# Patient Record
Sex: FEMALE | Race: WHITE | URBAN - METROPOLITAN AREA
[De-identification: names, ages, dates, MRNs, and addresses within clinical notes are randomized per-mention and may not be internally consistent; named-entity substitution may affect disease eponyms.]

---

## 2019-08-01 ENCOUNTER — HOSPITAL ENCOUNTER (EMERGENCY)
Facility: CLINIC | Age: 59
Discharge: HOME | End: 2019-08-01
Attending: INTERNAL MEDICINE | Admitting: INTERNAL MEDICINE
Payer: COMMERCIAL

## 2019-08-01 VITALS
TEMPERATURE: 97.4 F | HEART RATE: 87 BPM | OXYGEN SATURATION: 98 % | RESPIRATION RATE: 16 BRPM | WEIGHT: 165 LBS | SYSTOLIC BLOOD PRESSURE: 151 MMHG | HEIGHT: 65 IN | DIASTOLIC BLOOD PRESSURE: 84 MMHG | BODY MASS INDEX: 27.49 KG/M2

## 2019-08-01 DIAGNOSIS — R42 DIZZINESS: ICD-10-CM

## 2019-08-01 DIAGNOSIS — N39.0 ACUTE UTI: ICD-10-CM

## 2019-08-01 LAB
ALBUMIN UR-MCNC: NEGATIVE MG/DL
ANION GAP SERPL CALCULATED.3IONS-SCNC: 9 MMOL/L (ref 3–14)
APPEARANCE UR: CLEAR
BACTERIA #/AREA URNS HPF: ABNORMAL /HPF
BASOPHILS # BLD AUTO: 0.1 10E9/L (ref 0–0.2)
BASOPHILS NFR BLD AUTO: 0.8 %
BILIRUB UR QL STRIP: NEGATIVE
BUN SERPL-MCNC: 13 MG/DL (ref 7–30)
CALCIUM SERPL-MCNC: 8.6 MG/DL (ref 8.5–10.1)
CHLORIDE SERPL-SCNC: 106 MMOL/L (ref 94–109)
CO2 SERPL-SCNC: 26 MMOL/L (ref 20–32)
COLOR UR AUTO: ABNORMAL
CREAT SERPL-MCNC: 0.61 MG/DL (ref 0.52–1.04)
DIFFERENTIAL METHOD BLD: ABNORMAL
EOSINOPHIL # BLD AUTO: 0.4 10E9/L (ref 0–0.7)
EOSINOPHIL NFR BLD AUTO: 4.7 %
ERYTHROCYTE [DISTWIDTH] IN BLOOD BY AUTOMATED COUNT: 13.9 % (ref 10–15)
GFR SERPL CREATININE-BSD FRML MDRD: >90 ML/MIN/{1.73_M2}
GLUCOSE SERPL-MCNC: 109 MG/DL (ref 70–99)
GLUCOSE UR STRIP-MCNC: NEGATIVE MG/DL
HCT VFR BLD AUTO: 35.3 % (ref 35–47)
HGB BLD-MCNC: 11.3 G/DL (ref 11.7–15.7)
HGB UR QL STRIP: NEGATIVE
IMM GRANULOCYTES # BLD: 0 10E9/L (ref 0–0.4)
IMM GRANULOCYTES NFR BLD: 0.2 %
KETONES UR STRIP-MCNC: NEGATIVE MG/DL
LEUKOCYTE ESTERASE UR QL STRIP: ABNORMAL
LYMPHOCYTES # BLD AUTO: 2.5 10E9/L (ref 0.8–5.3)
LYMPHOCYTES NFR BLD AUTO: 28.9 %
MCH RBC QN AUTO: 27.8 PG (ref 26.5–33)
MCHC RBC AUTO-ENTMCNC: 32 G/DL (ref 31.5–36.5)
MCV RBC AUTO: 87 FL (ref 78–100)
MONOCYTES # BLD AUTO: 0.4 10E9/L (ref 0–1.3)
MONOCYTES NFR BLD AUTO: 4.7 %
MUCOUS THREADS #/AREA URNS LPF: PRESENT /LPF
NEUTROPHILS # BLD AUTO: 5.2 10E9/L (ref 1.6–8.3)
NEUTROPHILS NFR BLD AUTO: 60.7 %
NITRATE UR QL: NEGATIVE
NRBC # BLD AUTO: 0 10*3/UL
NRBC BLD AUTO-RTO: 0 /100
PH UR STRIP: 6.5 PH (ref 5–7)
PLATELET # BLD AUTO: 267 10E9/L (ref 150–450)
POTASSIUM SERPL-SCNC: 3.4 MMOL/L (ref 3.4–5.3)
RBC # BLD AUTO: 4.07 10E12/L (ref 3.8–5.2)
RBC #/AREA URNS AUTO: 2 /HPF (ref 0–2)
SODIUM SERPL-SCNC: 140 MMOL/L (ref 133–144)
SOURCE: ABNORMAL
SP GR UR STRIP: 1.01 (ref 1–1.03)
SQUAMOUS #/AREA URNS AUTO: 9 /HPF (ref 0–1)
TRANS CELLS #/AREA URNS HPF: <1 /HPF (ref 0–1)
TROPONIN I SERPL-MCNC: <0.015 UG/L (ref 0–0.04)
UROBILINOGEN UR STRIP-MCNC: NORMAL MG/DL (ref 0–2)
WBC # BLD AUTO: 8.6 10E9/L (ref 4–11)
WBC #/AREA URNS AUTO: 4 /HPF (ref 0–5)

## 2019-08-01 PROCEDURE — 96374 THER/PROPH/DIAG INJ IV PUSH: CPT

## 2019-08-01 PROCEDURE — 87086 URINE CULTURE/COLONY COUNT: CPT | Performed by: EMERGENCY MEDICINE

## 2019-08-01 PROCEDURE — 85025 COMPLETE CBC W/AUTO DIFF WBC: CPT | Performed by: EMERGENCY MEDICINE

## 2019-08-01 PROCEDURE — 96361 HYDRATE IV INFUSION ADD-ON: CPT

## 2019-08-01 PROCEDURE — 93005 ELECTROCARDIOGRAM TRACING: CPT

## 2019-08-01 PROCEDURE — 99284 EMERGENCY DEPT VISIT MOD MDM: CPT | Mod: Z6 | Performed by: INTERNAL MEDICINE

## 2019-08-01 PROCEDURE — 25000128 H RX IP 250 OP 636: Performed by: EMERGENCY MEDICINE

## 2019-08-01 PROCEDURE — 99284 EMERGENCY DEPT VISIT MOD MDM: CPT | Mod: 25

## 2019-08-01 PROCEDURE — 81001 URINALYSIS AUTO W/SCOPE: CPT | Performed by: EMERGENCY MEDICINE

## 2019-08-01 PROCEDURE — 80048 BASIC METABOLIC PNL TOTAL CA: CPT | Performed by: EMERGENCY MEDICINE

## 2019-08-01 PROCEDURE — 84484 ASSAY OF TROPONIN QUANT: CPT | Performed by: EMERGENCY MEDICINE

## 2019-08-01 RX ORDER — ESTRADIOL 1 MG/1
1 TABLET ORAL DAILY
COMMUNITY

## 2019-08-01 RX ORDER — CIPROFLOXACIN 500 MG/1
500 TABLET, FILM COATED ORAL 2 TIMES DAILY
Qty: 14 TABLET | Refills: 0 | Status: SHIPPED | OUTPATIENT
Start: 2019-08-01 | End: 2019-08-08

## 2019-08-01 RX ORDER — LISINOPRIL 10 MG/1
10 TABLET ORAL DAILY
COMMUNITY

## 2019-08-01 RX ORDER — ONDANSETRON 2 MG/ML
4 INJECTION INTRAMUSCULAR; INTRAVENOUS EVERY 30 MIN PRN
Status: DISCONTINUED | OUTPATIENT
Start: 2019-08-01 | End: 2019-08-02 | Stop reason: HOSPADM

## 2019-08-01 RX ORDER — SODIUM CHLORIDE 9 MG/ML
1000 INJECTION, SOLUTION INTRAVENOUS CONTINUOUS
Status: DISCONTINUED | OUTPATIENT
Start: 2019-08-01 | End: 2019-08-02 | Stop reason: HOSPADM

## 2019-08-01 RX ORDER — ATORVASTATIN CALCIUM 40 MG/1
40 TABLET, FILM COATED ORAL DAILY
COMMUNITY

## 2019-08-01 RX ADMIN — SODIUM CHLORIDE 1000 ML: 9 INJECTION, SOLUTION INTRAVENOUS at 19:45

## 2019-08-01 RX ADMIN — ONDANSETRON 4 MG: 2 INJECTION INTRAMUSCULAR; INTRAVENOUS at 19:45

## 2019-08-01 SDOH — HEALTH STABILITY: MENTAL HEALTH: HOW OFTEN DO YOU HAVE A DRINK CONTAINING ALCOHOL?: NEVER

## 2019-08-01 ASSESSMENT — ENCOUNTER SYMPTOMS
FLANK PAIN: 0
DYSURIA: 0
NAUSEA: 1
BACK PAIN: 0
DIARRHEA: 0
HEMATURIA: 0
SORE THROAT: 0
DIAPHORESIS: 1
SHORTNESS OF BREATH: 0
FREQUENCY: 0
FEVER: 0
DIZZINESS: 1
FATIGUE: 1

## 2019-08-01 ASSESSMENT — MIFFLIN-ST. JEOR: SCORE: 1334.32

## 2019-08-01 NOTE — ED AVS SNAPSHOT
[facility], [place], Emergency Department  [address] [address]  Phone:  [phone]                                    Milena Augustin   MRN: [id number]    Department:  [facility], Emergency Department   Date of Visit:  8/1/2019           After Visit Summary Signature Page    I have received my discharge instructions, and my questions have been answered. I have discussed any challenges I see with this plan with the nurse or doctor.    ..........................................................................................................................................  Patient/Patient Representative Signature      ..........................................................................................................................................  Patient Representative Print Name and Relationship to Patient    ..................................................               ................................................  Date                                   Time    ..........................................................................................................................................  Reviewed by Signature/Title    ...................................................              ..............................................  Date                                               Time          22EPIC Rev 08/18

## 2019-08-02 LAB
BACTERIA SPEC CULT: NO GROWTH
INTERPRETATION ECG - MUSE: NORMAL
Lab: NORMAL
SPECIMEN SOURCE: NORMAL

## 2019-08-02 NOTE — ED TRIAGE NOTES
Patient comes in through triage with complaints of dizziness, nausea, and sudden onset diaphoresis, starting approx. 1800 today. Pt. Reports this has not happened to her before. Pt. Denies palpations or chest pain. Hx of HTN

## 2019-08-02 NOTE — ED PROVIDER NOTES
"  History     Chief Complaint   Patient presents with     Dizziness     HPI  Milena Augustin is a 59 year old female who presents for evaluation of dizziness and nausea. Patient reports sudden onset of dizziness, nausea, and sweats today around 6 PM. She states she feels better now, though is somewhat fatigued. She denies back pain, fevers, sore throat, diarrhea, shortness of breath, or change in urination.     History reviewed. No pertinent past medical history.    Past Surgical History:   Procedure Laterality Date     CHOLECYSTECTOMY       GYN SURGERY         History reviewed. No pertinent family history.    Social History     Tobacco Use     Smoking status: Never Smoker     Smokeless tobacco: Never Used   Substance Use Topics     Alcohol use: Never     Frequency: Never       Current Facility-Administered Medications   Medication     ondansetron (ZOFRAN) injection 4 mg     sodium chloride 0.9% infusion     Current Outpatient Medications   Medication     atorvastatin (LIPITOR) 40 MG tablet     ciprofloxacin (CIPRO) 500 MG tablet     estradiol (ESTRACE) 1 MG tablet     lisinopril (PRINIVIL/ZESTRIL) 10 MG tablet      No Known Allergies     I have reviewed the Medications, Allergies, Past Medical and Surgical History, and Social History in the Epic system.    Review of Systems   Constitutional: Positive for diaphoresis and fatigue. Negative for fever.   HENT: Negative for sore throat.    Respiratory: Negative for shortness of breath.    Gastrointestinal: Positive for nausea. Negative for diarrhea.   Genitourinary: Negative for dysuria, flank pain, frequency, hematuria and urgency.   Musculoskeletal: Negative for back pain.   Neurological: Positive for dizziness.   All other systems reviewed and are negative.      Physical Exam   BP: (!) 162/93  Pulse: 82  Temp: 97.4  F (36.3  C)  Resp: 16  Height: 165.1 cm (5' 5\")  Weight: 74.8 kg (165 lb)  SpO2: 94 %      Physical Exam   Constitutional: She is oriented to person, " place, and time. No distress.   HENT:   Head: Atraumatic.   Mouth/Throat: Oropharynx is clear and moist. No oropharyngeal exudate.   Eyes: Pupils are equal, round, and reactive to light. No scleral icterus.   Neck: Neck supple. No JVD present.   Cardiovascular: Normal rate, regular rhythm, normal heart sounds and intact distal pulses. Exam reveals no gallop and no friction rub.   No murmur heard.  Pulmonary/Chest: Effort normal and breath sounds normal. No stridor. No respiratory distress. She has no wheezes. She has no rales. She exhibits no tenderness.   Abdominal: Soft. Bowel sounds are normal. She exhibits no distension and no mass. There is no tenderness. There is no rebound and no guarding. No hernia.   Musculoskeletal: She exhibits no edema or tenderness.   Neurological: She is alert and oriented to person, place, and time. She displays normal reflexes. No cranial nerve deficit or sensory deficit. She exhibits normal muscle tone. Coordination normal.   Skin: Skin is warm. No rash noted. She is not diaphoretic.       ED Course        Procedures       10:00 PM  The patient was seen and examined by Dr. Santiago in Room 4.          Critical Care time:  none         Results for orders placed or performed during the hospital encounter of 08/01/19 (from the past 24 hour(s))   EKG 12 lead     Status: None (Preliminary result)    Collection Time: 08/01/19  7:09 PM   Result Value Ref Range    Interpretation ECG Click View Image link to view waveform and result    CBC with platelets differential     Status: Abnormal    Collection Time: 08/01/19  7:23 PM   Result Value Ref Range    WBC 8.6 4.0 - 11.0 10e9/L    RBC Count 4.07 3.8 - 5.2 10e12/L    Hemoglobin 11.3 (L) 11.7 - 15.7 g/dL    Hematocrit 35.3 35.0 - 47.0 %    MCV 87 78 - 100 fl    MCH 27.8 26.5 - 33.0 pg    MCHC 32.0 31.5 - 36.5 g/dL    RDW 13.9 10.0 - 15.0 %    Platelet Count 267 150 - 450 10e9/L    Diff Method Automated Method     % Neutrophils 60.7 %    %  Lymphocytes 28.9 %    % Monocytes 4.7 %    % Eosinophils 4.7 %    % Basophils 0.8 %    % Immature Granulocytes 0.2 %    Nucleated RBCs 0 0 /100    Absolute Neutrophil 5.2 1.6 - 8.3 10e9/L    Absolute Lymphocytes 2.5 0.8 - 5.3 10e9/L    Absolute Monocytes 0.4 0.0 - 1.3 10e9/L    Absolute Eosinophils 0.4 0.0 - 0.7 10e9/L    Absolute Basophils 0.1 0.0 - 0.2 10e9/L    Abs Immature Granulocytes 0.0 0 - 0.4 10e9/L    Absolute Nucleated RBC 0.0    Basic metabolic panel     Status: Abnormal    Collection Time: 08/01/19  7:23 PM   Result Value Ref Range    Sodium 140 133 - 144 mmol/L    Potassium 3.4 3.4 - 5.3 mmol/L    Chloride 106 94 - 109 mmol/L    Carbon Dioxide 26 20 - 32 mmol/L    Anion Gap 9 3 - 14 mmol/L    Glucose 109 (H) 70 - 99 mg/dL    Urea Nitrogen 13 7 - 30 mg/dL    Creatinine 0.61 0.52 - 1.04 mg/dL    GFR Estimate >90 >60 mL/min/[1.73_m2]    GFR Estimate If Black >90 >60 mL/min/[1.73_m2]    Calcium 8.6 8.5 - 10.1 mg/dL   Troponin I     Status: None    Collection Time: 08/01/19  7:23 PM   Result Value Ref Range    Troponin I ES <0.015 0.000 - 0.045 ug/L   UA reflex to Microscopic and Culture     Status: Abnormal    Collection Time: 08/01/19  7:46 PM   Result Value Ref Range    Color Urine Light Yellow     Appearance Urine Clear     Glucose Urine Negative NEG^Negative mg/dL    Bilirubin Urine Negative NEG^Negative    Ketones Urine Negative NEG^Negative mg/dL    Specific Gravity Urine 1.011 1.003 - 1.035    Blood Urine Negative NEG^Negative    pH Urine 6.5 5.0 - 7.0 pH    Protein Albumin Urine Negative NEG^Negative mg/dL    Urobilinogen mg/dL Normal 0.0 - 2.0 mg/dL    Nitrite Urine Negative NEG^Negative    Leukocyte Esterase Urine Large (A) NEG^Negative    Source Midstream Urine     RBC Urine 2 0 - 2 /HPF    WBC Urine 4 0 - 5 /HPF    Bacteria Urine Few (A) NEG^Negative /HPF    Squamous Epithelial /HPF Urine 9 (H) 0 - 1 /HPF    Transitional Epi <1 0 - 1 /HPF    Mucous Urine Present (A) NEG^Negative /LPF   Urine  Culture Aerobic Bacterial     Status: None (Preliminary result)    Collection Time: 08/01/19  7:46 PM   Result Value Ref Range    Specimen Description Midstream Urine     Special Requests Specimen received in preservative     Culture Micro PENDING            Labs Ordered and Resulted from Time of ED Arrival Up to the Time of Departure from the ED   CBC WITH PLATELETS DIFFERENTIAL - Abnormal; Notable for the following components:       Result Value    Hemoglobin 11.3 (*)     All other components within normal limits   BASIC METABOLIC PANEL - Abnormal; Notable for the following components:    Glucose 109 (*)     All other components within normal limits   UA MACROSCOPIC WITH REFLEX TO MICRO AND CULTURE - Abnormal; Notable for the following components:    Leukocyte Esterase Urine Large (*)     Bacteria Urine Few (*)     Squamous Epithelial /HPF Urine 9 (*)     Mucous Urine Present (*)     All other components within normal limits   TROPONIN I   URINE CULTURE AEROBIC BACTERIAL            Assessments & Plan (with Medical Decision Making)  Dizziness, cardiac work up neg with EKG and trop, clinically no suggestion for CVA, labs and ua suggestive for UTI, will start cipro and follow up with her PMD within one week.       I have reviewed the nursing notes.    I have reviewed the findings, diagnosis, plan and need for follow up with the patient.       Medication List      Started    ciprofloxacin 500 MG tablet  Commonly known as:  CIPRO  500 mg, Oral, 2 TIMES DAILY            Final diagnoses:   Dizziness   Acute UTI   I, Nicolas Hernandez, am serving as a trained medical scribe to document services personally performed by Rick Santiago MD, based on the provider's statements to me.   IRick MD, was physically present and have reviewed and verified the accuracy of this note documented by Nicolas Hernandez.    8/1/2019   [facility], [place], EMERGENCY DEPARTMENT     Rick Santiago MD  08/01/19 [id number]

## 2019-08-03 ENCOUNTER — TELEPHONE (OUTPATIENT)
Dept: EMERGENCY MEDICINE | Facility: CLINIC | Age: 59
End: 2019-08-03

## 2019-08-03 NOTE — TELEPHONE ENCOUNTER
"[facility] Emergency Department Lab result notification:    [facility] ED lab result protocol used  Urine culture    Reason for call  Notify of lab results, assess symptoms,  review ED providers recommendations/discharge instructions (if necessary) and advise per ED lab result f/u protocol    Lab Result   Final urine culture report shows \"NO GROWTH\" and is NEGATIVE.  Emergency Dept discharge antibiotic: Ciprofloxacin (Cipro) 500 mg tablet, 1 tablet (500 mg) by mouth 2 times daily for 7 days.  Is ED discharge Rx antibiotic for UTI only (Yes/No): Yes  Recommendations per [facility] ED Lab result protocol - Urine culture protocol.  Information table from ED Provider visit on 8/1/19  Symptoms reported at ED visit (Chief complaint, HPI) Milena Augustin is a 59 year old female who presents for evaluation of dizziness and nausea. Patient reports sudden onset of dizziness, nausea, and sweats today around 6 PM. She states she feels better now, though is somewhat fatigued. She denies back pain, fevers, sore throat, diarrhea, shortness of breath, or change in urination.    ED providers Impression and Plan (applicable information) Dizziness, cardiac work up neg with EKG and trop, clinically no suggestion for CVA, labs and ua suggestive for UTI, will start cipro and follow up with her PMD within one week.   Miscellaneous information N/A     RN Assessment (Patient s current Symptoms), include time called.  [Insert Left message here if message left]  Milena feeling \"better\", feels she is at baseline.    RN Recommendations/Instructions per [facility] ED lab result protocol  Not on abx previous to ED visit, advised okay to stop abx.    Please Contact your PCP clinic or return to the Emergency department if your:    Symptoms return.    Symptoms worsen or other concerning symptom's.    PCP follow-up Questions asked: YES       Arabella Nation RN    [email or web address]   Lung Nodule and ED Lab Results F/U RN  Epic pool (ED late " result f/u RN) : [id number]  Ph # [phone]    Copy of Lab result   Order   Urine Culture Aerobic Bacterial [[id number]] (Order [id number])   Exam Information     Exam Date Exam Time Accession # Results    8/1/19  7:46 PM [id number]    Specimen Information: Midstream Urine        Component Collected Lab   Specimen Description 08/01/2019  7:46    Midstream Urine    Special Requests 08/01/2019  7:46    Specimen received in preservative    Culture Micro 08/01/2019  7:46    No growth